# Patient Record
Sex: MALE | Race: ASIAN | NOT HISPANIC OR LATINO | URBAN - METROPOLITAN AREA
[De-identification: names, ages, dates, MRNs, and addresses within clinical notes are randomized per-mention and may not be internally consistent; named-entity substitution may affect disease eponyms.]

---

## 2025-03-23 ENCOUNTER — OFFICE VISIT (OUTPATIENT)
Dept: URGENT CARE | Age: 50
End: 2025-03-23
Payer: COMMERCIAL

## 2025-03-23 VITALS
WEIGHT: 170 LBS | OXYGEN SATURATION: 95 % | SYSTOLIC BLOOD PRESSURE: 132 MMHG | BODY MASS INDEX: 25.76 KG/M2 | TEMPERATURE: 98.5 F | HEIGHT: 68 IN | HEART RATE: 86 BPM | RESPIRATION RATE: 20 BRPM | DIASTOLIC BLOOD PRESSURE: 89 MMHG

## 2025-03-23 DIAGNOSIS — J20.9 ACUTE BRONCHITIS, UNSPECIFIED ORGANISM: Primary | ICD-10-CM

## 2025-03-23 PROCEDURE — 1036F TOBACCO NON-USER: CPT | Performed by: PHYSICIAN ASSISTANT

## 2025-03-23 PROCEDURE — 99203 OFFICE O/P NEW LOW 30 MIN: CPT | Performed by: PHYSICIAN ASSISTANT

## 2025-03-23 PROCEDURE — 3008F BODY MASS INDEX DOCD: CPT | Performed by: PHYSICIAN ASSISTANT

## 2025-03-23 RX ORDER — SERTRALINE HYDROCHLORIDE 50 MG/1
TABLET, FILM COATED ORAL
COMMUNITY
Start: 2024-11-15

## 2025-03-23 ASSESSMENT — ENCOUNTER SYMPTOMS: COUGH: 1

## 2025-03-23 NOTE — PROGRESS NOTES
"Subjective   Patient ID: Miguel Martinez is a 49 y.o. male. They present today with a chief complaint of Cough (Cough for over a month, was seen at [facility] clinic 1 week ago, rx'd albuterol and benzonatate w/ no relief. ).    History of Present Illness  Reports exclusively dry cough for the past month for which she has tried benzonatate, Flonase, and albuterol as prescribed by [facility] approximately 1 week ago.  States his symptoms seem to be exacerbated after taking albuterol.  States his throat feels raw from all of the coughing, and noticed mild streaks of blood in his clear mucus yesterday after a coughing fit.  States his coughing wakes him up in the middle of the night.  States his friend who is a physician recommended he come in and get amoxicillin for his persistent cough.  Denies nasal congestion, drainage, fever, shortness of breath, chest pain, leg swelling.      Cough        Past Medical History  Allergies as of 03/23/2025    (No Known Allergies)       (Not in a hospital admission)       No past medical history on file.    No past surgical history on file.     reports that he has never smoked. He has never used smokeless tobacco. He reports that he does not drink alcohol and does not use drugs.    Review of Systems  Pertinent systems reviewed and were negative unless otherwise stated in HPI.    Objective    Vitals:    03/23/25 1929   BP: 132/89   BP Location: Left arm   Patient Position: Sitting   BP Cuff Size: Large adult   Pulse: 86   Resp: 20   Temp: 36.9 °C (98.5 °F)   TempSrc: Oral   SpO2: 95%   Weight: 77.1 kg (170 lb)   Height: 1.727 m (5' 8\")     No LMP for male patient.    Physical Exam  Constitutional:       General: He is not in acute distress.  HENT:      Right Ear: External ear normal.      Left Ear: External ear normal.      Nose: No congestion.      Mouth/Throat:      Mouth: Mucous membranes are moist.      Pharynx: No oropharyngeal exudate or posterior oropharyngeal erythema. "   Eyes:      Conjunctiva/sclera: Conjunctivae normal.   Cardiovascular:      Rate and Rhythm: Normal rate and regular rhythm.   Pulmonary:      Effort: Pulmonary effort is normal. No respiratory distress.      Breath sounds: No wheezing or rhonchi.      Comments: Negative egophony  Skin:     Findings: No rash.         Diagnostic study results (if any) were reviewed by Alec Garner PA-C.    Assessment/Plan   Allergies, medications, history, and pertinent labs/EKGs/imaging reviewed by Alec Garner PA-C.     Medical Decision Making  Low concern for pneumonia given no fever, no shortness of breath, normal vitals, no respiratory distress, no productive cough, clear lungs bilaterally.  Symptoms most consistent with bronchitis.  Advised discontinuing albuterol, as he has no shortness of breath that it would benefit.  No indication for steroids, no wheezing.  Deferring antibiotics at this time to avoid side effects without clear benefit.  Offered chest x-ray, however patient declined due to concern of cost.  Explained x-rays included in the visit, still declined due to his specific insurance coverage. Advised Delsym, humidified air, nasal saline. Monitor for SOB, leg swelling, fever, increased sputum production with change of color that is consistent throughout the day.    Orders and Diagnoses  Diagnoses and all orders for this visit:  Acute bronchitis, unspecified organism      Medical Admin Record      Disposition: Home    Electronically signed by Alec Garner PA-C